# Patient Record
Sex: MALE | Race: WHITE | Employment: OTHER | URBAN - METROPOLITAN AREA
[De-identification: names, ages, dates, MRNs, and addresses within clinical notes are randomized per-mention and may not be internally consistent; named-entity substitution may affect disease eponyms.]

---

## 2021-06-11 PROCEDURE — 88112 CYTOPATH CELL ENHANCE TECH: CPT

## 2021-06-14 ENCOUNTER — HOSPITAL ENCOUNTER (OUTPATIENT)
Dept: LAB | Age: 76
Discharge: HOME | End: 2021-06-14

## 2021-09-13 ENCOUNTER — HOSPITAL ENCOUNTER (OUTPATIENT)
Dept: LAB | Age: 76
Discharge: HOME | End: 2021-09-13
Payer: MEDICARE

## 2021-09-13 DIAGNOSIS — C68.9 UROTHELIAL CANCER (HCC): ICD-10-CM

## 2021-09-13 LAB
APPEARANCE UR: CLEAR
BACTERIA URNS QL MICRO: 0 /HPF
BILIRUB UR QL: NEGATIVE
CASTS URNS QL MICRO: 0 /LPF
COLOR UR: YELLOW
CRYSTALS URNS QL MICRO: 0 /LPF
EPI CELLS #/AREA URNS HPF: 0 /HPF
GLUCOSE UR STRIP.AUTO-MCNC: NEGATIVE MG/DL
HGB UR QL STRIP: ABNORMAL
KETONES UR QL STRIP.AUTO: NEGATIVE MG/DL
LEUKOCYTE ESTERASE UR QL STRIP.AUTO: NEGATIVE
MUCOUS THREADS URNS QL MICRO: 0 /LPF
NITRITE UR QL STRIP.AUTO: NEGATIVE
PH UR STRIP: 6.5 [PH] (ref 5–9)
PROT UR STRIP-MCNC: NEGATIVE MG/DL
RBC #/AREA URNS HPF: NORMAL /HPF
SP GR UR REFRACTOMETRY: 1.02 (ref 1–1.02)
UROBILINOGEN UR QL STRIP.AUTO: 0.2 EU/DL (ref 0.2–1)
WBC URNS QL MICRO: 0 /HPF

## 2021-09-13 PROCEDURE — 88112 CYTOPATH CELL ENHANCE TECH: CPT

## 2021-09-13 PROCEDURE — 81003 URINALYSIS AUTO W/O SCOPE: CPT

## 2021-09-13 PROCEDURE — 81015 MICROSCOPIC EXAM OF URINE: CPT

## 2021-09-29 ENCOUNTER — HOSPITAL ENCOUNTER (OUTPATIENT)
Dept: CT IMAGING | Age: 76
Discharge: HOME | End: 2021-09-29
Attending: UROLOGY
Payer: MEDICARE

## 2021-09-29 DIAGNOSIS — Z85.46 HISTORY OF PROSTATE CANCER: ICD-10-CM

## 2021-09-29 LAB — CREAT BLD-MCNC: 0.73 MG/DL (ref 0.8–1.5)

## 2021-09-29 PROCEDURE — 82565 ASSAY OF CREATININE: CPT

## 2021-09-29 PROCEDURE — 74011000258 HC RX REV CODE- 258: Performed by: UROLOGY

## 2021-09-29 PROCEDURE — 74011000636 HC RX REV CODE- 636: Performed by: UROLOGY

## 2021-09-29 PROCEDURE — 74178 CT ABD&PLV WO CNTR FLWD CNTR: CPT

## 2021-09-29 RX ORDER — SODIUM CHLORIDE 0.9 % (FLUSH) 0.9 %
10 SYRINGE (ML) INJECTION
Status: COMPLETED | OUTPATIENT
Start: 2021-09-29 | End: 2021-09-29

## 2021-09-29 RX ADMIN — SODIUM CHLORIDE 100 ML: 900 INJECTION, SOLUTION INTRAVENOUS at 09:12

## 2021-09-29 RX ADMIN — Medication 10 ML: at 09:12

## 2021-09-29 RX ADMIN — IOPAMIDOL 100 ML: 755 INJECTION, SOLUTION INTRAVENOUS at 09:11

## 2021-12-13 ENCOUNTER — HOSPITAL ENCOUNTER (OUTPATIENT)
Dept: LAB | Age: 76
Discharge: HOME | End: 2021-12-13
Payer: MEDICARE

## 2021-12-13 DIAGNOSIS — C68.9 UROTHELIAL CANCER (HCC): ICD-10-CM

## 2021-12-13 LAB
APPEARANCE UR: CLEAR
BACTERIA URNS QL MICRO: 0 /HPF
BILIRUB UR QL: NEGATIVE
CASTS URNS QL MICRO: 0 /LPF
COLOR UR: YELLOW
CRYSTALS URNS QL MICRO: 0 /LPF
EPI CELLS #/AREA URNS HPF: 0 /HPF
GLUCOSE UR STRIP.AUTO-MCNC: NEGATIVE MG/DL
HGB UR QL STRIP: ABNORMAL
KETONES UR QL STRIP.AUTO: NEGATIVE MG/DL
LEUKOCYTE ESTERASE UR QL STRIP.AUTO: NEGATIVE
MUCOUS THREADS URNS QL MICRO: 0 /LPF
NITRITE UR QL STRIP.AUTO: NEGATIVE
PH UR STRIP: 6 [PH] (ref 5–9)
PROT UR STRIP-MCNC: NEGATIVE MG/DL
RBC #/AREA URNS HPF: NORMAL /HPF
SP GR UR REFRACTOMETRY: 1.02 (ref 1–1.02)
UROBILINOGEN UR QL STRIP.AUTO: 0.2 EU/DL (ref 0.2–1)
WBC URNS QL MICRO: NORMAL /HPF

## 2021-12-13 PROCEDURE — 81015 MICROSCOPIC EXAM OF URINE: CPT

## 2021-12-13 PROCEDURE — 88112 CYTOPATH CELL ENHANCE TECH: CPT

## 2021-12-13 PROCEDURE — 81003 URINALYSIS AUTO W/O SCOPE: CPT

## 2021-12-14 LAB
NON-GYNECOLOGIC CYTOLOGY REPRT: NORMAL
SPECIMEN SOURCE: NORMAL

## 2021-12-21 PROBLEM — C67.2 MALIGNANT NEOPLASM OF LATERAL WALL OF URINARY BLADDER (HCC): Status: ACTIVE | Noted: 2021-12-21

## 2021-12-27 ENCOUNTER — HOSPITAL ENCOUNTER (OUTPATIENT)
Dept: INFUSION THERAPY | Age: 76
Discharge: HOME | End: 2021-12-27
Payer: MEDICARE

## 2021-12-27 ENCOUNTER — HOSPITAL ENCOUNTER (OUTPATIENT)
Dept: LAB | Age: 76
Discharge: HOME | End: 2021-12-27

## 2021-12-27 VITALS
TEMPERATURE: 98.2 F | HEART RATE: 67 BPM | DIASTOLIC BLOOD PRESSURE: 100 MMHG | BODY MASS INDEX: 44.73 KG/M2 | SYSTOLIC BLOOD PRESSURE: 164 MMHG | OXYGEN SATURATION: 95 % | RESPIRATION RATE: 18 BRPM | WEIGHT: 315 LBS

## 2021-12-27 DIAGNOSIS — C67.2 MALIGNANT NEOPLASM OF LATERAL WALL OF URINARY BLADDER (HCC): Primary | ICD-10-CM

## 2021-12-27 LAB
APPEARANCE UR: CLEAR
BACTERIA URNS QL MICRO: 0 /HPF
BILIRUB UR QL: NEGATIVE
CASTS URNS QL MICRO: 0 /LPF
COLOR UR: YELLOW
CRYSTALS URNS QL MICRO: 0 /LPF
EPI CELLS #/AREA URNS HPF: ABNORMAL /HPF
GLUCOSE UR STRIP.AUTO-MCNC: NEGATIVE MG/DL
HGB UR QL STRIP: ABNORMAL
KETONES UR QL STRIP.AUTO: NEGATIVE MG/DL
LEUKOCYTE ESTERASE UR QL STRIP.AUTO: NEGATIVE
MUCOUS THREADS URNS QL MICRO: ABNORMAL /LPF
NITRITE UR QL STRIP.AUTO: NEGATIVE
PH UR STRIP: 5.5 [PH] (ref 5–9)
PROT UR STRIP-MCNC: NEGATIVE MG/DL
RBC #/AREA URNS HPF: ABNORMAL /HPF
SP GR UR REFRACTOMETRY: 1.02 (ref 1–1.02)
UROBILINOGEN UR QL STRIP.AUTO: 0.2 EU/DL (ref 0.2–1)
WBC URNS QL MICRO: ABNORMAL /HPF

## 2021-12-27 PROCEDURE — 74011000258 HC RX REV CODE- 258: Performed by: UROLOGY

## 2021-12-27 PROCEDURE — 74011000250 HC RX REV CODE- 250: Performed by: UROLOGY

## 2021-12-27 PROCEDURE — 81015 MICROSCOPIC EXAM OF URINE: CPT

## 2021-12-27 PROCEDURE — 51720 TREATMENT OF BLADDER LESION: CPT

## 2021-12-27 PROCEDURE — 74011250636 HC RX REV CODE- 250/636: Performed by: UROLOGY

## 2021-12-27 PROCEDURE — 81003 URINALYSIS AUTO W/O SCOPE: CPT

## 2021-12-27 RX ORDER — LIDOCAINE HYDROCHLORIDE 20 MG/ML
JELLY TOPICAL ONCE
Status: COMPLETED | OUTPATIENT
Start: 2021-12-27 | End: 2021-12-27

## 2021-12-27 RX ADMIN — LIDOCAINE HYDROCHLORIDE: 20 JELLY TOPICAL at 17:30

## 2021-12-27 RX ADMIN — SODIUM CHLORIDE 50 MG: 9 INJECTION, SOLUTION INTRAVENOUS at 17:45

## 2021-12-27 NOTE — PROGRESS NOTES
Arrived to the [facility] ambulatory. Atkins placed by Dallas Fuentes RN. UA and BCG Intravesical completed. Patient tolerated well. Any issues or concerns during appointment: no.  Patient aware of next infusion appointment on 1/3 at 1700. Patient instructed to call provider with temperature of 100.4 or greater or nausea/vomiting/ diarrhea or pain not controlled by medications  Discharged to home ambulatory.

## 2022-01-03 ENCOUNTER — HOSPITAL ENCOUNTER (OUTPATIENT)
Dept: LAB | Age: 77
Discharge: HOME | End: 2022-01-03
Payer: MEDICARE

## 2022-01-03 ENCOUNTER — HOSPITAL ENCOUNTER (OUTPATIENT)
Dept: INFUSION THERAPY | Age: 77
Discharge: HOME | End: 2022-01-03
Payer: MEDICARE

## 2022-01-03 VITALS
DIASTOLIC BLOOD PRESSURE: 99 MMHG | TEMPERATURE: 97.6 F | SYSTOLIC BLOOD PRESSURE: 133 MMHG | WEIGHT: 315 LBS | HEART RATE: 72 BPM | OXYGEN SATURATION: 94 % | RESPIRATION RATE: 18 BRPM | BODY MASS INDEX: 44.21 KG/M2

## 2022-01-03 DIAGNOSIS — C67.2 MALIGNANT NEOPLASM OF LATERAL WALL OF URINARY BLADDER (HCC): Primary | ICD-10-CM

## 2022-01-03 DIAGNOSIS — C67.2 MALIGNANT NEOPLASM OF LATERAL WALL OF URINARY BLADDER (HCC): ICD-10-CM

## 2022-01-03 LAB
APPEARANCE UR: CLEAR
BACTERIA URNS QL MICRO: NORMAL /HPF
BILIRUB UR QL: NEGATIVE
CASTS URNS QL MICRO: 0 /LPF
COLOR UR: YELLOW
CRYSTALS URNS QL MICRO: 0 /LPF
EPI CELLS #/AREA URNS HPF: NORMAL /HPF
GLUCOSE UR STRIP.AUTO-MCNC: NEGATIVE MG/DL
HGB UR QL STRIP: ABNORMAL
KETONES UR QL STRIP.AUTO: NEGATIVE MG/DL
LEUKOCYTE ESTERASE UR QL STRIP.AUTO: NEGATIVE
MUCOUS THREADS URNS QL MICRO: NORMAL /LPF
NITRITE UR QL STRIP.AUTO: NEGATIVE
PH UR STRIP: 6 [PH] (ref 5–9)
PROT UR STRIP-MCNC: NEGATIVE MG/DL
RBC #/AREA URNS HPF: NORMAL /HPF
SP GR UR REFRACTOMETRY: >=1.03 (ref 1–1.02)
UROBILINOGEN UR QL STRIP.AUTO: 0.2 EU/DL (ref 0.2–1)
WBC URNS QL MICRO: NORMAL /HPF

## 2022-01-03 PROCEDURE — 51720 TREATMENT OF BLADDER LESION: CPT

## 2022-01-03 PROCEDURE — 74011000258 HC RX REV CODE- 258: Performed by: UROLOGY

## 2022-01-03 PROCEDURE — 81015 MICROSCOPIC EXAM OF URINE: CPT

## 2022-01-03 PROCEDURE — 74011250636 HC RX REV CODE- 250/636: Performed by: UROLOGY

## 2022-01-03 PROCEDURE — 74011000250 HC RX REV CODE- 250: Performed by: UROLOGY

## 2022-01-03 PROCEDURE — 81003 URINALYSIS AUTO W/O SCOPE: CPT

## 2022-01-03 RX ORDER — LIDOCAINE HYDROCHLORIDE 20 MG/ML
JELLY TOPICAL ONCE
Status: COMPLETED | OUTPATIENT
Start: 2022-01-03 | End: 2022-01-03

## 2022-01-03 RX ADMIN — LIDOCAINE HYDROCHLORIDE: 20 JELLY TOPICAL at 17:21

## 2022-01-03 RX ADMIN — SODIUM CHLORIDE 50 MG: 900 INJECTION, SOLUTION INTRAVENOUS at 17:40

## 2022-01-03 NOTE — PROGRESS NOTES
Reviewed Intravesicular instillation of BCG to bladder with patient. Good understanding of procedure voiced.

## 2022-01-03 NOTE — ADDENDUM NOTE
Encounter addended by: Cynthia Castañeda, [facility] [facility] on: 1/3/2022 5:29 PM   Actions taken: i-Vent created or edited

## 2022-01-03 NOTE — PROGRESS NOTES
Arrived to dept for Intravesicular live BCG treatment. #16 Zelaya catheter placed via sterile technique per Kassidy Galvan RN with yellow urine obtained. Lidocaine gel used as per order prior to zelaya placement. PPE used as per policy and Intravesicular BCG instilled without difficulty. Patient tolerated without bladder cramping. Patient aware to hold urine for 2 hours, empty bladder, use 2 cups of bleach after each voiding to toilet and allow to set in toilet 15-20 minutes and then flush. Verbalizes understanding. Patient to notify MD for hematuria, fever 100.4 or greater,any uncontrolled n/v, or diarrhea. Verbalizes understanding. Patient discharged via ambulation accompanied by wife. Next appointment is 01/10/2022 at 1700 with Infusion.

## 2022-01-10 ENCOUNTER — HOSPITAL ENCOUNTER (OUTPATIENT)
Dept: INFUSION THERAPY | Age: 77
Discharge: HOME | End: 2022-01-10
Payer: MEDICARE

## 2022-01-10 ENCOUNTER — HOSPITAL ENCOUNTER (OUTPATIENT)
Dept: LAB | Age: 77
Discharge: HOME | End: 2022-01-10

## 2022-01-10 VITALS
HEART RATE: 65 BPM | OXYGEN SATURATION: 96 % | DIASTOLIC BLOOD PRESSURE: 87 MMHG | TEMPERATURE: 98 F | BODY MASS INDEX: 44.08 KG/M2 | WEIGHT: 315 LBS | RESPIRATION RATE: 18 BRPM | SYSTOLIC BLOOD PRESSURE: 140 MMHG

## 2022-01-10 DIAGNOSIS — C67.2 MALIGNANT NEOPLASM OF LATERAL WALL OF URINARY BLADDER (HCC): Primary | ICD-10-CM

## 2022-01-10 LAB
APPEARANCE UR: CLEAR
BACTERIA URNS QL MICRO: NORMAL /HPF
BILIRUB UR QL: NEGATIVE
CASTS URNS QL MICRO: 0 /LPF
COLOR UR: YELLOW
CRYSTALS URNS QL MICRO: 0 /LPF
EPI CELLS #/AREA URNS HPF: NORMAL /HPF
GLUCOSE UR STRIP.AUTO-MCNC: NEGATIVE MG/DL
HGB UR QL STRIP: ABNORMAL
KETONES UR QL STRIP.AUTO: NEGATIVE MG/DL
LEUKOCYTE ESTERASE UR QL STRIP.AUTO: NEGATIVE
MUCOUS THREADS URNS QL MICRO: 0 /LPF
NITRITE UR QL STRIP.AUTO: NEGATIVE
PH UR STRIP: 6 [PH] (ref 5–9)
PROT UR STRIP-MCNC: NEGATIVE MG/DL
RBC #/AREA URNS HPF: NORMAL /HPF
SP GR UR REFRACTOMETRY: >=1.03 (ref 1–1.02)
UROBILINOGEN UR QL STRIP.AUTO: 0.2 EU/DL (ref 0.2–1)
WBC URNS QL MICRO: NORMAL /HPF

## 2022-01-10 PROCEDURE — 51720 TREATMENT OF BLADDER LESION: CPT

## 2022-01-10 PROCEDURE — 81015 MICROSCOPIC EXAM OF URINE: CPT

## 2022-01-10 PROCEDURE — 74011000250 HC RX REV CODE- 250: Performed by: UROLOGY

## 2022-01-10 PROCEDURE — 81003 URINALYSIS AUTO W/O SCOPE: CPT

## 2022-01-10 PROCEDURE — 74011000258 HC RX REV CODE- 258: Performed by: UROLOGY

## 2022-01-10 PROCEDURE — 74011250636 HC RX REV CODE- 250/636: Performed by: UROLOGY

## 2022-01-10 RX ORDER — LIDOCAINE HYDROCHLORIDE 20 MG/ML
JELLY TOPICAL ONCE
Status: COMPLETED | OUTPATIENT
Start: 2022-01-10 | End: 2022-01-10

## 2022-01-10 RX ADMIN — LIDOCAINE HYDROCHLORIDE: 20 JELLY TOPICAL at 17:15

## 2022-01-10 RX ADMIN — SODIUM CHLORIDE 50 MG: 9 INJECTION, SOLUTION INTRAVENOUS at 17:50

## 2022-01-10 NOTE — ADDENDUM NOTE
Encounter addended by: Joselin Winn, [address] [facility] on: 1/10/2022 5:10 PM   Actions taken: iLaureen created or edited

## 2022-01-10 NOTE — ADDENDUM NOTE
Encounter addended by: Nenita Penny on: 1/10/2022 5:27 PM   Actions taken: i-Sarahi created or edited

## 2022-01-10 NOTE — PROGRESS NOTES
Arrived to the [facility]. #16 Atkins inserted by Fabiola Canales RN with premed of Lidociane gel. PPE worn per policy. BCG instillation completed. Patient tolerated well. Pt educated to hold urine for 2 hours, empty bladder, and then use 2 cups of bleach after voiding in toilet while waiting 15 to 20 min to flush. Any issues or concerns during appointment: verbalized discontent with wait time on lab result for UA and preparation time with the pharmacy. Emotional support given. Patient aware of next infusion appointment on (none. .has completed 3 out of 3 treatments). Patient aware of next lab and [facility] office visit on 4/1/22 at 21 [phone]. Patient instructed to call provider with hematuria, temperature of 100.4 or greater or nausea/vomiting/ diarrhea or pain not controlled by medications  Discharged amb.

## 2022-03-20 PROBLEM — C67.2 MALIGNANT NEOPLASM OF LATERAL WALL OF URINARY BLADDER (HCC): Status: ACTIVE | Noted: 2021-12-21

## 2022-04-01 ENCOUNTER — HOSPITAL ENCOUNTER (OUTPATIENT)
Dept: LAB | Age: 77
Discharge: HOME | End: 2022-04-01

## 2022-08-04 NOTE — PROGRESS NOTES
FOLLOW UP CYSTOSCOPY PROCEDURE CLINIC NOTE      INTERVAL HISTORY: Patient is here today for routine surveillance cystoscopy. 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 He is status post an induction course of BCG and completed his third maintenance course on 1/10/2022. He has had a history of abnormal urinary cytologies that might be related to his prior radiation treatment. He has no complaints today he denies any hematuria. He has been feeling well. From previous note(s):  He has no complaints today. He tolerated his third maintenance course of BCG well. He completed it on 1/10/2022. He currently does not have any worsening lower urinary tract symptoms. He denies any gross hematuria. From prior note:     He is a prior patient of Dr. Lexi Jesus in [place]. 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 He also has a distant history of prostate cancer which was treated back in 2004 with external beam radiation. He underwent an induction course of BCG which he completed on 8/13/2020. He then had his first 3-week maintenance course finishing in 12/1/20. He underwent a bladder biopsy on 1/27/2021. It was a right lateral wall biopsy that showed chronic inflammation and benign urothelium. He had a suspicious urine cytology at last several appts . He finished his second BCG maintenance (3 week course) on 7/14/21. His third maintenance course of BCG was completed on 11/10/2022. Suspicious urine cytologies may be from prior radiation. CT IVP 9/29/21 unremarkable. HISTORY OF PRESENT ILLNESS: Mr. Serena Vivas is a 78 y.o. male with a diagnosis of UC with above history; patient is here today for follow-up surveillance cystoscopy      Past medical history, surgical history, medications, allergies, family history and social history were reviewed and are unchanged other than what is noted above.  Patient denies any hematuria or new lower urinary tract symptoms. REVIEW OF SYSTEMS: As above. All other systems negative. ALLERGIES:  Not on File    PHYSICAL EXAMINATION:  There were no vitals taken for this visit. GENERAL: The patient is in no acute distress. CV:  Reg. Normal perfusion  PULMONARY: normal respiratory effort, no JVD, no audible wheezing. ABDOMEN: Soft, nontender. GENITOURINARY EXAM: Unremarkable. EXTREMITIES: Warm and well perfused. LABORATORY RESULTS:  No results found for this visit on 08/05/22. PROCEDURE NOTE: After informed consent was obtained, the patient was prepped and draped in the usual sterile fashion. A 2% lidocaine jelly was instilled in the patient's urethra. We performed a pre-procedure time out. I then inserted a flexible cystoscope and under direct vision carried it into the patient's bladder. The urethra appeared normal.  The right and left ureteral orifices appeared normal.  The entire bladder was surveyed and showed normal-appearing bladder. He has a well-healed TUR scar on the right sidewall. There is no evidence of tumor stones or any mucosal abnormalities. The scope was removed and the patient tolerated the procedure well. There were no complications. IMPRESSION AND PLAN: Mr. Todd Hernandez is a 78 y.o. male with a diagnosis of HG Ta UC, s/p BCG. I see no evidence of disease recurrence today. I would like to check a urine cytology. We discussed moving forward with his fourth maintenance BCG treatment in the next several weeks. Again we discussed the risk and benefits of BCG. He would like to move forward with that treatment. I like to see him back in approximately 3 months for his next surveillance cystoscopy.

## 2022-08-05 ENCOUNTER — PROCEDURE VISIT (OUTPATIENT)
Dept: ONCOLOGY | Age: 77
End: 2022-08-05
Payer: MEDICARE

## 2022-08-05 ENCOUNTER — HOSPITAL ENCOUNTER (OUTPATIENT)
Dept: LAB | Age: 77
Discharge: HOME | End: 2022-08-08
Payer: MEDICARE

## 2022-08-05 VITALS
HEIGHT: 72 IN | TEMPERATURE: 97.7 F | DIASTOLIC BLOOD PRESSURE: 69 MMHG | HEART RATE: 61 BPM | OXYGEN SATURATION: 96 % | SYSTOLIC BLOOD PRESSURE: 105 MMHG | RESPIRATION RATE: 16 BRPM | BODY MASS INDEX: 42.66 KG/M2 | WEIGHT: 315 LBS

## 2022-08-05 DIAGNOSIS — C67.2 MALIGNANT NEOPLASM OF LATERAL WALL OF URINARY BLADDER (HCC): ICD-10-CM

## 2022-08-05 DIAGNOSIS — C67.2 MALIGNANT NEOPLASM OF LATERAL WALL OF URINARY BLADDER (HCC): Primary | ICD-10-CM

## 2022-08-05 LAB
BILIRUBIN, URINE, POC: NEGATIVE
BLOOD URINE, POC: NEGATIVE
GLUCOSE URINE, POC: NEGATIVE
KETONES, URINE, POC: NEGATIVE
LEUKOCYTE ESTERASE, URINE, POC: NEGATIVE
NITRITE, URINE, POC: NEGATIVE
PH, URINE, POC: 5.5 (ref 4.6–8)
PROTEIN,URINE, POC: NEGATIVE
SPECIFIC GRAVITY, URINE, POC: 1.02 (ref 1–1.03)
URINALYSIS CLARITY, POC: CLEAR
URINALYSIS COLOR, POC: YELLOW
UROBILINOGEN, POC: 0.2

## 2022-08-05 PROCEDURE — 81003 URINALYSIS AUTO W/O SCOPE: CPT | Performed by: UROLOGY

## 2022-08-05 PROCEDURE — 88112 CYTOPATH CELL ENHANCE TECH: CPT

## 2022-08-05 PROCEDURE — 52000 CYSTOURETHROSCOPY: CPT | Performed by: UROLOGY

## 2022-08-05 RX ORDER — GABAPENTIN 300 MG/1
CAPSULE ORAL
COMMUNITY
Start: 2022-08-01

## 2022-08-05 ASSESSMENT — PATIENT HEALTH QUESTIONNAIRE - PHQ9
SUM OF ALL RESPONSES TO PHQ QUESTIONS 1-9: 0
2. FEELING DOWN, DEPRESSED OR HOPELESS: 0
SUM OF ALL RESPONSES TO PHQ QUESTIONS 1-9: 0

## 2022-08-05 NOTE — PATIENT INSTRUCTIONS
Patient Instructions from Today's Visit    Reason for Visit:  Cystoscopy     Diagnosis Information:  [email or web address]    Plan:  Cystoscopy today     Follow Up: We will continue with BCG treatments. We will do another cystoscopy in 3 months. Recent Lab Results:  N/a    Treatment Summary has been discussed and given to patient: n/a        -------------------------------------------------------------------------------------------------------------------  Please call our office at [phone] if you have any  of the following symptoms:   Fever of 100.5 or greater  Chills  Shortness of breath  Swelling or pain in one leg    After office hours an answering service is available and will contact a provider for emergencies or if you are experiencing any of the above symptoms. Patient does express an interest in My Chart. My Chart log in information explained on the after visit summary printout at the [facility] desk.     Lu Parham MA

## 2022-08-08 ENCOUNTER — TELEPHONE (OUTPATIENT)
Dept: ONCOLOGY | Age: 77
End: 2022-08-08

## 2022-08-08 LAB
CYTOLOGY-NON GYN: NORMAL
SPECIMEN SOURCE: NORMAL

## 2022-08-09 ENCOUNTER — TELEPHONE (OUTPATIENT)
Dept: ONCOLOGY | Age: 77
End: 2022-08-09

## 2022-08-09 NOTE — TELEPHONE ENCOUNTER
Spoke with pt and reviewed recent urine cytology per . pt informed me he already read the results on Drawn to Scale.  Informed pt to call with any future questions or concerns. //KG

## 2022-08-12 RX ORDER — DIPHENHYDRAMINE HYDROCHLORIDE 50 MG/ML
50 INJECTION INTRAMUSCULAR; INTRAVENOUS
Status: CANCELLED | OUTPATIENT
Start: 2022-08-17

## 2022-08-12 RX ORDER — SODIUM CHLORIDE 9 MG/ML
INJECTION, SOLUTION INTRAVENOUS CONTINUOUS
Status: CANCELLED | OUTPATIENT
Start: 2022-08-17

## 2022-08-12 RX ORDER — ALBUTEROL SULFATE 90 UG/1
4 AEROSOL, METERED RESPIRATORY (INHALATION) PRN
Status: CANCELLED | OUTPATIENT
Start: 2022-08-17

## 2022-08-12 RX ORDER — ONDANSETRON 2 MG/ML
8 INJECTION INTRAMUSCULAR; INTRAVENOUS
Status: CANCELLED | OUTPATIENT
Start: 2022-08-17

## 2022-08-12 RX ORDER — ONDANSETRON 2 MG/ML
8 INJECTION INTRAMUSCULAR; INTRAVENOUS
Status: CANCELLED | OUTPATIENT
Start: 2022-08-24

## 2022-08-12 RX ORDER — SODIUM CHLORIDE 9 MG/ML
INJECTION, SOLUTION INTRAVENOUS CONTINUOUS
Status: CANCELLED | OUTPATIENT
Start: 2022-08-24

## 2022-08-12 RX ORDER — DIPHENHYDRAMINE HYDROCHLORIDE 50 MG/ML
50 INJECTION INTRAMUSCULAR; INTRAVENOUS
Status: CANCELLED | OUTPATIENT
Start: 2022-08-24

## 2022-08-12 RX ORDER — LIDOCAINE HYDROCHLORIDE 20 MG/ML
JELLY TOPICAL ONCE
Status: CANCELLED | OUTPATIENT
Start: 2022-08-24 | End: 2022-08-24

## 2022-08-12 RX ORDER — MEPERIDINE HYDROCHLORIDE 50 MG/ML
12.5 INJECTION INTRAMUSCULAR; INTRAVENOUS; SUBCUTANEOUS PRN
Status: CANCELLED | OUTPATIENT
Start: 2022-08-24

## 2022-08-12 RX ORDER — MEPERIDINE HYDROCHLORIDE 50 MG/ML
12.5 INJECTION INTRAMUSCULAR; INTRAVENOUS; SUBCUTANEOUS PRN
Status: CANCELLED | OUTPATIENT
Start: 2022-08-17

## 2022-08-12 RX ORDER — ALBUTEROL SULFATE 90 UG/1
4 AEROSOL, METERED RESPIRATORY (INHALATION) PRN
Status: CANCELLED | OUTPATIENT
Start: 2022-08-24

## 2022-08-12 RX ORDER — ONDANSETRON 2 MG/ML
8 INJECTION INTRAMUSCULAR; INTRAVENOUS
Status: CANCELLED | OUTPATIENT
Start: 2022-08-31

## 2022-08-12 RX ORDER — FAMOTIDINE 10 MG/ML
20 INJECTION, SOLUTION INTRAVENOUS
Status: CANCELLED | OUTPATIENT
Start: 2022-08-24

## 2022-08-12 RX ORDER — ACETAMINOPHEN 325 MG/1
650 TABLET ORAL
Status: CANCELLED | OUTPATIENT
Start: 2022-08-31

## 2022-08-12 RX ORDER — FAMOTIDINE 10 MG/ML
20 INJECTION, SOLUTION INTRAVENOUS
Status: CANCELLED | OUTPATIENT
Start: 2022-08-31

## 2022-08-12 RX ORDER — LIDOCAINE HYDROCHLORIDE 20 MG/ML
JELLY TOPICAL ONCE
Status: CANCELLED | OUTPATIENT
Start: 2022-08-31 | End: 2022-08-31

## 2022-08-12 RX ORDER — FAMOTIDINE 10 MG/ML
20 INJECTION, SOLUTION INTRAVENOUS
Status: CANCELLED | OUTPATIENT
Start: 2022-08-17

## 2022-08-12 RX ORDER — EPINEPHRINE 1 MG/ML
0.3 INJECTION, SOLUTION, CONCENTRATE INTRAVENOUS PRN
Status: CANCELLED | OUTPATIENT
Start: 2022-08-31

## 2022-08-12 RX ORDER — MEPERIDINE HYDROCHLORIDE 50 MG/ML
12.5 INJECTION INTRAMUSCULAR; INTRAVENOUS; SUBCUTANEOUS PRN
Status: CANCELLED | OUTPATIENT
Start: 2022-08-31

## 2022-08-12 RX ORDER — ACETAMINOPHEN 325 MG/1
650 TABLET ORAL
Status: CANCELLED | OUTPATIENT
Start: 2022-08-17

## 2022-08-12 RX ORDER — SODIUM CHLORIDE 9 MG/ML
INJECTION, SOLUTION INTRAVENOUS CONTINUOUS
Status: CANCELLED | OUTPATIENT
Start: 2022-08-31

## 2022-08-12 RX ORDER — EPINEPHRINE 1 MG/ML
0.3 INJECTION, SOLUTION, CONCENTRATE INTRAVENOUS PRN
Status: CANCELLED | OUTPATIENT
Start: 2022-08-24

## 2022-08-12 RX ORDER — ACETAMINOPHEN 325 MG/1
650 TABLET ORAL
Status: CANCELLED | OUTPATIENT
Start: 2022-08-24

## 2022-08-12 RX ORDER — DIPHENHYDRAMINE HYDROCHLORIDE 50 MG/ML
50 INJECTION INTRAMUSCULAR; INTRAVENOUS
Status: CANCELLED | OUTPATIENT
Start: 2022-08-31

## 2022-08-12 RX ORDER — EPINEPHRINE 1 MG/ML
0.3 INJECTION, SOLUTION, CONCENTRATE INTRAVENOUS PRN
Status: CANCELLED | OUTPATIENT
Start: 2022-08-17

## 2022-08-12 RX ORDER — LIDOCAINE HYDROCHLORIDE 20 MG/ML
JELLY TOPICAL ONCE
Status: CANCELLED | OUTPATIENT
Start: 2022-08-17 | End: 2022-08-17

## 2022-08-12 RX ORDER — ALBUTEROL SULFATE 90 UG/1
4 AEROSOL, METERED RESPIRATORY (INHALATION) PRN
Status: CANCELLED | OUTPATIENT
Start: 2022-08-31

## 2022-08-17 ENCOUNTER — HOSPITAL ENCOUNTER (OUTPATIENT)
Dept: INFUSION THERAPY | Age: 77
Discharge: HOME | End: 2022-08-17
Payer: MEDICARE

## 2022-08-17 ENCOUNTER — HOSPITAL ENCOUNTER (OUTPATIENT)
Dept: LAB | Age: 77
Discharge: HOME | End: 2022-08-20

## 2022-08-17 VITALS
DIASTOLIC BLOOD PRESSURE: 72 MMHG | WEIGHT: 315 LBS | RESPIRATION RATE: 16 BRPM | BODY MASS INDEX: 44.05 KG/M2 | HEART RATE: 57 BPM | SYSTOLIC BLOOD PRESSURE: 113 MMHG | OXYGEN SATURATION: 97 % | TEMPERATURE: 97.9 F

## 2022-08-17 DIAGNOSIS — C67.2 MALIGNANT NEOPLASM OF LATERAL WALL OF URINARY BLADDER (HCC): Primary | ICD-10-CM

## 2022-08-17 LAB
APPEARANCE UR: CLEAR
BILIRUB UR QL: NEGATIVE
COLOR UR: YELLOW
GLUCOSE UR STRIP.AUTO-MCNC: NEGATIVE MG/DL
HGB UR QL STRIP: NEGATIVE
KETONES UR QL STRIP.AUTO: NEGATIVE MG/DL
LEUKOCYTE ESTERASE UR QL STRIP.AUTO: NEGATIVE
NITRITE UR QL STRIP.AUTO: NEGATIVE
PH UR STRIP: 6 [PH] (ref 5–9)
PROT UR STRIP-MCNC: NEGATIVE MG/DL
SP GR UR REFRACTOMETRY: 1.02 (ref 1–1.02)
UROBILINOGEN UR QL STRIP.AUTO: 0.2 EU/DL (ref 0.2–1)

## 2022-08-17 PROCEDURE — 51720 TREATMENT OF BLADDER LESION: CPT

## 2022-08-17 PROCEDURE — 6360000002 HC RX W HCPCS: Performed by: UROLOGY

## 2022-08-17 PROCEDURE — 81003 URINALYSIS AUTO W/O SCOPE: CPT

## 2022-08-17 PROCEDURE — 6370000000 HC RX 637 (ALT 250 FOR IP): Performed by: UROLOGY

## 2022-08-17 PROCEDURE — 2580000003 HC RX 258: Performed by: UROLOGY

## 2022-08-17 RX ORDER — LIDOCAINE HYDROCHLORIDE 20 MG/ML
JELLY TOPICAL ONCE
Status: COMPLETED | OUTPATIENT
Start: 2022-08-17 | End: 2022-08-17

## 2022-08-17 RX ADMIN — LIDOCAINE HYDROCHLORIDE: 20 JELLY TOPICAL at 10:10

## 2022-08-17 RX ADMIN — SODIUM CHLORIDE 50 MG: 900 INJECTION, SOLUTION INTRAVENOUS at 10:28

## 2022-08-17 NOTE — PROGRESS NOTES
Arrived to the [facility]. Consent signed and BCG bladder instillation completed. Patient tolerated well and no problems occurred during the procedure. Any issues or concerns during appointment: No.  Discharged home in stable condition.

## 2022-08-24 ENCOUNTER — HOSPITAL ENCOUNTER (OUTPATIENT)
Dept: INFUSION THERAPY | Age: 77
Discharge: HOME | End: 2022-08-24
Payer: MEDICARE

## 2022-08-24 ENCOUNTER — HOSPITAL ENCOUNTER (OUTPATIENT)
Dept: LAB | Age: 77
Discharge: HOME | End: 2022-08-27

## 2022-08-24 VITALS
BODY MASS INDEX: 43.94 KG/M2 | WEIGHT: 315 LBS | DIASTOLIC BLOOD PRESSURE: 76 MMHG | OXYGEN SATURATION: 97 % | HEART RATE: 67 BPM | SYSTOLIC BLOOD PRESSURE: 143 MMHG | TEMPERATURE: 98 F | RESPIRATION RATE: 16 BRPM

## 2022-08-24 DIAGNOSIS — C67.2 MALIGNANT NEOPLASM OF LATERAL WALL OF URINARY BLADDER (HCC): Primary | ICD-10-CM

## 2022-08-24 PROCEDURE — 6370000000 HC RX 637 (ALT 250 FOR IP): Performed by: UROLOGY

## 2022-08-24 PROCEDURE — 2580000003 HC RX 258: Performed by: UROLOGY

## 2022-08-24 PROCEDURE — 6360000002 HC RX W HCPCS: Performed by: UROLOGY

## 2022-08-24 PROCEDURE — 51720 TREATMENT OF BLADDER LESION: CPT

## 2022-08-24 PROCEDURE — 81003 URINALYSIS AUTO W/O SCOPE: CPT

## 2022-08-24 RX ORDER — LIDOCAINE HYDROCHLORIDE 20 MG/ML
JELLY TOPICAL ONCE
Status: COMPLETED | OUTPATIENT
Start: 2022-08-24 | End: 2022-08-24

## 2022-08-24 RX ADMIN — SODIUM CHLORIDE 50 MG: 9 INJECTION, SOLUTION INTRAVENOUS at 10:40

## 2022-08-24 RX ADMIN — LIDOCAINE HYDROCHLORIDE: 20 JELLY TOPICAL at 10:06

## 2022-08-24 NOTE — PROGRESS NOTES
Patient arrived to the infusion center for Intravesical BCG. Mojica Catheter placed using sterile technique and lidocaine jelly. BCG instilled into bladder and mojica removed. Patient tolerated procedure well. Patient given the following instructions: The medication should be retained for 2 hours for best results   You should attempt to rotate back to front and side to side for best results   Sit down on the toilet to urinate to avoid splashing and fully empty your bladder    After urinating, pour two (2) cups of household bleach onto the toilet    Let the medication and bleach mixture sit in the toilet for 15-20 minutes before flushing    Repeat the above process each time you urinate for six (6) hours after each treatment day    Wash your hands and genital areas thoroughly after you urinate   Drink plenty of fluids after your instillation to flush your bladder   You may experience some burning with your first void after your treatment. If this occurs, you need to increase your fluid intake   If you experience continued pain or burning on urination or you experience and of the following symptom you should contact your doctor     Urgency   Frequency of urination   Bright red blood or blood clots in the urine   Joint pain   Coughing   Skin rash    Fever   Chills   Malaise (feeling of general discomfort)   Increased fatigue   Flu-like symptoms     Patient discharged home to self care .   Next appointment is 8/31/22 at 10am

## 2022-08-31 ENCOUNTER — HOSPITAL ENCOUNTER (OUTPATIENT)
Dept: INFUSION THERAPY | Age: 77
Discharge: HOME | End: 2022-08-31
Payer: MEDICARE

## 2022-08-31 ENCOUNTER — HOSPITAL ENCOUNTER (OUTPATIENT)
Dept: LAB | Age: 77
Discharge: HOME | End: 2022-09-03

## 2022-08-31 VITALS
TEMPERATURE: 97.5 F | BODY MASS INDEX: 44.05 KG/M2 | WEIGHT: 315 LBS | OXYGEN SATURATION: 95 % | RESPIRATION RATE: 16 BRPM | DIASTOLIC BLOOD PRESSURE: 70 MMHG | SYSTOLIC BLOOD PRESSURE: 135 MMHG | HEART RATE: 61 BPM

## 2022-08-31 DIAGNOSIS — C67.2 MALIGNANT NEOPLASM OF LATERAL WALL OF URINARY BLADDER (HCC): Primary | ICD-10-CM

## 2022-08-31 LAB
APPEARANCE UR: CLEAR
BILIRUB UR QL: NEGATIVE
COLOR UR: YELLOW
GLUCOSE UR STRIP.AUTO-MCNC: NEGATIVE MG/DL
HGB UR QL STRIP: NEGATIVE
KETONES UR QL STRIP.AUTO: NEGATIVE MG/DL
LEUKOCYTE ESTERASE UR QL STRIP.AUTO: NEGATIVE
NITRITE UR QL STRIP.AUTO: NEGATIVE
PH UR STRIP: 5.5 [PH] (ref 5–9)
PROT UR STRIP-MCNC: NEGATIVE MG/DL
SP GR UR REFRACTOMETRY: >=1.03 (ref 1–1.02)
UROBILINOGEN UR QL STRIP.AUTO: 0.2 EU/DL (ref 0.2–1)

## 2022-08-31 PROCEDURE — 6360000002 HC RX W HCPCS: Performed by: UROLOGY

## 2022-08-31 PROCEDURE — 81003 URINALYSIS AUTO W/O SCOPE: CPT

## 2022-08-31 PROCEDURE — 2580000003 HC RX 258: Performed by: UROLOGY

## 2022-08-31 PROCEDURE — 51720 TREATMENT OF BLADDER LESION: CPT

## 2022-08-31 PROCEDURE — 6370000000 HC RX 637 (ALT 250 FOR IP): Performed by: UROLOGY

## 2022-08-31 RX ORDER — LIDOCAINE HYDROCHLORIDE 20 MG/ML
JELLY TOPICAL ONCE
Status: COMPLETED | OUTPATIENT
Start: 2022-08-31 | End: 2022-08-31

## 2022-08-31 RX ADMIN — LIDOCAINE HYDROCHLORIDE: 20 JELLY TOPICAL at 10:19

## 2022-08-31 RX ADMIN — SODIUM CHLORIDE 50 MG: 9 INJECTION, SOLUTION INTRAVENOUS at 10:58

## 2022-08-31 NOTE — PROGRESS NOTES
[facility] Labs and BCG instillation completed. Patient tolerated without problems. Any issues or concerns during appointment: no  Patient aware to follow up with MD as scheduled  Patient instructed to call provider with temperature of 100.4 or greater or nausea/vomiting/ diarrhea or pain not controlled by medications  Discharged ambulatory.

## 2022-11-23 NOTE — PROGRESS NOTES
FOLLOW UP CYSTOSCOPY PROCEDURE CLINIC NOTE    Patient is here today for follow-up. 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 He is status post an induction course of BCG and now for courses of maintenance BCG. He finished his fourth maintenance course on 8/31/2022. His last cytology from 8/5/2022 was suspicious. He has no complaints today. He denies any hematuria or dysuria. He did experience some worsening urgency during his latest BCG treatments, but that has since resolved. INTERVAL HISTORY:     From previous note(s):  Patient is here today for routine surveillance cystoscopy. 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 He is status post an induction course of BCG and completed his third maintenance course on 1/10/2022. He has had a history of abnormal urinary cytologies that might be related to his prior radiation treatment. He has no complaints today he denies any hematuria. He has been feeling well. From previous note(s):  He has no complaints today. He tolerated his third maintenance course of BCG well. He completed it on 1/10/2022. He currently does not have any worsening lower urinary tract symptoms. He denies any gross hematuria. From prior note:     He is a prior patient of Dr. Laurel Mensah in [place]. 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 He also has a distant history of prostate cancer which was treated back in 2004 with external beam radiation. He underwent an induction course of BCG which he completed on 8/13/2020. He then had his first 3-week maintenance course finishing in 12/1/20. He underwent a bladder biopsy on 1/27/2021. It was a right lateral wall biopsy that showed chronic inflammation and benign urothelium. He had a suspicious urine cytology at last several appts . He finished his second BCG maintenance (3 week course) on 7/14/21.    His third maintenance course of BCG was completed on 11/10/2022. Suspicious urine cytologies may be from prior radiation. CT IVP 9/29/21 unremarkable. HISTORY OF PRESENT ILLNESS: Mr. Genoveva Bates is a 78 y.o. male with a diagnosis of UC with above history; patient is here today for follow-up surveillance cystoscopy. Past medical history, surgical history, medications, allergies, family history and social history were reviewed and are unchanged other than what is noted above. Patient denies any hematuria or new lower urinary tract symptoms. REVIEW OF SYSTEMS: As above. All other systems negative. ALLERGIES:  No Known Allergies    PHYSICAL EXAMINATION:  There were no vitals taken for this visit. GENERAL: The patient is in no acute distress. CV:  Reg. Normal perfusion  PULMONARY: normal respiratory effort, no JVD, no audible wheezing. ABDOMEN: Soft, nontender. GENITOURINARY EXAM: Unremarkable. EXTREMITIES: Warm and well perfused. LABORATORY RESULTS:  No results found for this visit on 11/28/22. PROCEDURE NOTE: After informed consent was obtained, the patient was prepped and draped in the usual sterile fashion. A 2% lidocaine jelly was instilled in the patient's urethra. We performed a pre-procedure time out. I then inserted a flexible cystoscope and under direct vision carried it into the patient's bladder. The urethra appeared normal.  The right and left ureteral orifices appeared normal.  The entire bladder was surveyed and showed a well-healed TUR scar on right sidewall. There were no mucosal lesions, tumors or worrisome findings. The scope was removed and the patient tolerated the procedure well. There were no complications. IMPRESSION AND PLAN: Mr. Genoveva Bates is a 78 y.o. 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 I see no evidence of disease recurrence today. I am concerned about his previous suspicious cytology.   We will get a repeat urine cytology today. Of note his last CT scan was in September 2021. I plan to repeat that (CT abdomen pelvis with and without contrast and delayed images) when he returns in 3 months for cystoscopy. If his cytology today is suspicious, then we will send a FISH and go ahead and repeat his imaging sooner.     -RTC in 3 months for cysto and with CT IVP

## 2022-11-28 ENCOUNTER — PROCEDURE VISIT (OUTPATIENT)
Dept: ONCOLOGY | Age: 77
End: 2022-11-28
Payer: MEDICARE

## 2022-11-28 ENCOUNTER — HOSPITAL ENCOUNTER (OUTPATIENT)
Dept: LAB | Age: 77
Discharge: HOME | End: 2022-12-01
Payer: MEDICARE

## 2022-11-28 VITALS
TEMPERATURE: 97.5 F | HEART RATE: 63 BPM | DIASTOLIC BLOOD PRESSURE: 75 MMHG | RESPIRATION RATE: 24 BRPM | HEIGHT: 72 IN | OXYGEN SATURATION: 95 % | WEIGHT: 315 LBS | SYSTOLIC BLOOD PRESSURE: 129 MMHG | BODY MASS INDEX: 42.66 KG/M2

## 2022-11-28 DIAGNOSIS — C67.2 MALIGNANT NEOPLASM OF LATERAL WALL OF URINARY BLADDER (HCC): ICD-10-CM

## 2022-11-28 DIAGNOSIS — C67.2 MALIGNANT NEOPLASM OF LATERAL WALL OF URINARY BLADDER (HCC): Primary | ICD-10-CM

## 2022-11-28 LAB
BILIRUBIN, URINE, POC: NEGATIVE
BLOOD URINE, POC: NORMAL
GLUCOSE URINE, POC: NEGATIVE
KETONES, URINE, POC: NEGATIVE
LEUKOCYTE ESTERASE, URINE, POC: NEGATIVE
NITRITE, URINE, POC: NEGATIVE
PH, URINE, POC: 5.5 (ref 4.6–8)
PROTEIN,URINE, POC: NEGATIVE
SPECIFIC GRAVITY, URINE, POC: 1.01 (ref 1–1.03)
URINALYSIS CLARITY, POC: CLEAR
URINALYSIS COLOR, POC: YELLOW
UROBILINOGEN, POC: 0.2

## 2022-11-28 PROCEDURE — 81003 URINALYSIS AUTO W/O SCOPE: CPT | Performed by: UROLOGY

## 2022-11-28 PROCEDURE — 52000 CYSTOURETHROSCOPY: CPT | Performed by: UROLOGY

## 2022-11-28 PROCEDURE — 88112 CYTOPATH CELL ENHANCE TECH: CPT

## 2022-11-28 RX ORDER — MELOXICAM 15 MG/1
TABLET ORAL
COMMUNITY
Start: 2022-11-10

## 2022-11-28 ASSESSMENT — PATIENT HEALTH QUESTIONNAIRE - PHQ9
2. FEELING DOWN, DEPRESSED OR HOPELESS: 0
SUM OF ALL RESPONSES TO PHQ QUESTIONS 1-9: 0
SUM OF ALL RESPONSES TO PHQ9 QUESTIONS 1 & 2: 0
SUM OF ALL RESPONSES TO PHQ QUESTIONS 1-9: 0
1. LITTLE INTEREST OR PLEASURE IN DOING THINGS: 0
SUM OF ALL RESPONSES TO PHQ QUESTIONS 1-9: 0
SUM OF ALL RESPONSES TO PHQ QUESTIONS 1-9: 0

## 2022-11-28 NOTE — PROGRESS NOTES
Pre Cystoscopy   At risk factors reviewed:   Autoimmune diseases: no  Artificial Joints: no  Mechanical heart valve/pacemaker: no  Indwelling ureteral stent: no  Indwelling catheter: no  Elderly >80: no  Smoker: no  Oral steroid/prednisone use: no  Low weight: no  Hx of frequent UTI's: no  Prolonged hospital stay in the past 6mths (>10days): no  Diabetes: no    Provider informed of at risk factors: yes    ABX given: no

## 2022-11-28 NOTE — PATIENT INSTRUCTIONS
Patient Instructions from Today's Visit    Reason for Visit:  Cystoscopy     Diagnosis Information:  [email or web address]    Plan:  - your bladder continues to look good - we will do another cytology today. - you will be due for your scan next time you come back. Follow Up:  - 3 month cysto, scan prior    Recent Lab Results:  No visits with results within 3 Day(s) from this visit. Latest known visit with results is:   Hospital Outpatient Visit on 08/31/2022   Component Date Value Ref Range Status    Color, UA 08/31/2022 YELLOW    Final    Appearance 08/31/2022 CLEAR    Final    Specific Gravity, UA 08/31/2022 >=1.030  1.001 - 1.023 Final    pH, Urine 08/31/2022 5.5  5.0 - 9.0   Final    Protein, UA 08/31/2022 Negative  NEG mg/dL Final    Glucose, UA 08/31/2022 Negative  NEG mg/dL Final    Ketones, Urine 08/31/2022 Negative  NEG mg/dL Final    Bilirubin Urine 08/31/2022 Negative  NEG   Final    Blood, Urine 08/31/2022 Negative  NEG   Final    Urobilinogen, Urine 08/31/2022 0.2  0.2 - 1.0 EU/dL Final    Nitrite, Urine 08/31/2022 Negative  NEG   Final    Leukocyte Esterase, Urine 08/31/2022 Negative  NEG   Final         Treatment Summary has been discussed and given to patient: n/a        -------------------------------------------------------------------------------------------------------------------  Please call our office at [phone] if you have any  of the following symptoms:   Fever of 100.5 or greater  Chills  Shortness of breath  Swelling or pain in one leg    After office hours an answering service is available and will contact a provider for emergencies or if you are experiencing any of the above symptoms. Patient did express an interest in My Chart. My Chart log in information explained on the after visit summary printout at the [facility] desk.     Diana Jolly MA

## 2022-11-29 LAB
CYTOLOGY-NON GYN: NORMAL
SPECIMEN SOURCE: NORMAL

## 2022-12-02 ENCOUNTER — TELEPHONE (OUTPATIENT)
Dept: ONCOLOGY | Age: 77
End: 2022-12-02

## 2022-12-06 DIAGNOSIS — C67.2 MALIGNANT NEOPLASM OF LATERAL WALL OF URINARY BLADDER (HCC): Primary | ICD-10-CM

## 2022-12-08 ENCOUNTER — HOSPITAL ENCOUNTER (OUTPATIENT)
Dept: CT IMAGING | Age: 77
Discharge: HOME | End: 2022-12-11

## 2022-12-08 DIAGNOSIS — C67.2 MALIGNANT NEOPLASM OF LATERAL WALL OF URINARY BLADDER (HCC): ICD-10-CM

## 2022-12-15 NOTE — PROGRESS NOTES
[address] [facility]  [address] [facility]  [place], [address] [place] [address]  [phone]        Mr. Tayo Jack is a 78 y.o. 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 INTERVAL HISTORY: Patient is here today with his wife for follow-up. He has had multiple suspicious cytologies. More recently we ordered a FISH which came back as positive. 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 He also has a remote history of prostate cancer and was treated with external beam radiation therapy back in 2004. His last surveillance cystoscopy was unremarkable. His last bladder biopsy on 9/17/2021 was benign. He has not had any hematuria or significant dysuria. He underwent a CT of the abdomen pelvis on 12/8/2022. There was no hydronephrosis or upper tract abnormalities. There was slight, stable, irregularity along the left posterior lateral bladder wall. There is no pelvic lymphadenopathy. From previous note:     Patient is here today for follow-up. 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 He is status post an induction course of BCG and now for courses of maintenance BCG. He finished his fourth maintenance course on 8/31/2022. His last cytology from 8/5/2022 was suspicious. He has no complaints today. He denies any hematuria or dysuria. He did experience some worsening urgency during his latest BCG treatments, but that has since resolved. Past medical, family and social histories, as well as medications and allergies, were reviewed and updated in the medical record as appropriate.     PMH:     Past Medical History:   Diagnosis Date    Cancer ([facility] [address]     prostate       MEDs:     albuterol sulfate HFA  amLODIPine-olmesartan  dilTIAZem  gabapentin  Lumigan Soln  melatonin Tabs  meloxicam  omeprazole  rosuvastatin  triamterene-hydroCHLOROthiazide     ALLERGIES:    No Known Allergies    ROS:     Review of Systems   Constitutional: Negative. Negative for chills, fatigue and fever. Respiratory: Negative. Cardiovascular: Negative. Gastrointestinal: Negative. Genitourinary: Negative. Negative for difficulty urinating, dysuria, flank pain, frequency, hematuria and urgency. Musculoskeletal: Negative. All other systems reviewed and are negative. PHYSICAL EXAMINATION    BP 93/77 (Site: Left Upper Arm, Position: Sitting, Cuff Size: Large Adult)   Pulse 67   Temp 97.7 °F (36.5 °C) (Oral)   Resp 16   Ht 6' (1.829 m)   Wt (!) 324 lb 14.4 oz (147.4 kg)   SpO2 96%   BMI 44.06 kg/m²   General: well dressed, well nourished, no acute distress  Skin: no rashes  HEENT: Sclera are clear,normocephalic, atraumatic. no external lesions   Cardiovascular: Reg. Normal perfusion  Respiratory: normal respiratory effort, no JVD, no audible wheezing. Musculoskeletal: unremarkable with normal function. No embolic signs or cyanosis. Neurologic exam: intact, no focal deficits, moves all 4 extremities  Psych: normal mood and affect, alert, oriented x 3  LE:  no edema  : DEFERRED     IMAGING:    CT Results:    === 12/08/22 ===    CT ABDOMEN PELVIS W IV CONTRAST    - Narrative -  CT OF THE ABDOMEN AND PELVIS WITH CONTRAST. CLINICAL INDICATION: Malignant neoplasm of the bladder    PROCEDURE: Serial thin section axial images obtained from the lung bases through  the proximal femurs following the administration of 100 cc of Isovue 370  intravenous contrast. Delayed imaging was also performed. Radiation dose  reduction techniques were used for this study.  Our CT scanners use one or all of  the following: Automated exposure control, adjusted of the mA and/or kV  according to patient size, iterative reconstruction    COMPARISON: CT abdomen and pelvis dated 9/29/2021    FINDINGS: The tiny pulmonary nodule at the right lung base remains stable. No  new pulmonary nodules noted. CT ABDOMEN: The liver and spleen are normal except for calcified granulomas. No  hepatic mass evident. The gallbladder is normal. The adrenal glands are normal.  The stomach is decompressed. The kidneys are symmetric in size and contrast  enhancement. There is a simple 2.6 cm right renal cyst. No solid renal lesion  evident. There is no hydronephrosis. No upper abdominal or retroperitoneal  lymphadenopathy. There is no ascites. The bowel is normal.    CT PELVIS: The rectum is normal. No inguinal hernia or adenopathy. Delayed  imaging through the bladder show the bladder to be distended with contrast  without intraluminal filling defect. There is slight irregularity along the left  posterior lateral bladder wall with small diverticulum as noted on the prior  exam and not significant changed. The UVJs are normal. No new bladder  irregularity appreciated. The ureters are symmetric in size and contrast  enhancement. No filling defects noted in the renal pelvises. Radiation seeds  noted in the prostate. No aggressive bone lesions identified. - Impression -  1. No acute abdominal or pelvic abnormality. 2. No mass clearly appreciated in the bilateral upper collecting systems,  ureters, or bladder. There is stable slight irregularity along the left  posterior lateral bladder wall. 3. No lymphadenopathy. ASSESSMENT:    Mr. Jasiel Dunham is a 78 y.o. 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 We discussed his positive FISH test in detail today. I explained that my preference would be to perform a bluelight cystoscopy with bladder biopsy, prostatic urethral biopsy, bilateral diagnostic ureteroscopy with possible ureteral washings or biopsy. We also discussed the potential for continued observation. He like to discuss this more with his wife and call and let us know.   For now he is scheduled for repeat office cystoscopy in 3 months but we will cancel that if he elects to move forward with biopsy and ureteroscopy in the OR. We discussed the risk of that procedure in detail as he is well-informed. He has been through similar procedures multiple times. PLAN:   -review FISH results   -review CT a/p w cont   -discussed Bluelight cystoscopy and need for referral if he opts for that route  Rtc as scheduled for cystoscopy   -Bilat ureteroscopy, cystoscopy, bladder biopsy, prostatic urethra biopsy to be scheduled if pt decides to proceed. ________________________________________      I have seen and examined this patient. I have reviewed and edited the note started by the MA and agree with the outlined plan. Part of this note was written by using a voice dictation software. The note has been proof read but may still contain some grammatical/other typographical errors.       Tisha Chavarria  Urology

## 2022-12-16 ENCOUNTER — OFFICE VISIT (OUTPATIENT)
Dept: ONCOLOGY | Age: 77
End: 2022-12-16
Payer: MEDICARE

## 2022-12-16 VITALS
HEIGHT: 72 IN | BODY MASS INDEX: 42.66 KG/M2 | OXYGEN SATURATION: 96 % | TEMPERATURE: 97.7 F | SYSTOLIC BLOOD PRESSURE: 93 MMHG | HEART RATE: 67 BPM | RESPIRATION RATE: 16 BRPM | WEIGHT: 315 LBS | DIASTOLIC BLOOD PRESSURE: 77 MMHG

## 2022-12-16 DIAGNOSIS — C67.8 MALIGNANT NEOPLASM OF OVERLAPPING SITES OF BLADDER (HCC): Primary | ICD-10-CM

## 2022-12-16 DIAGNOSIS — E66.01 OBESITY, CLASS III, BMI 40-49.9 (MORBID OBESITY) (HCC): ICD-10-CM

## 2022-12-16 PROCEDURE — 1036F TOBACCO NON-USER: CPT | Performed by: UROLOGY

## 2022-12-16 PROCEDURE — 3017F COLORECTAL CA SCREEN DOC REV: CPT | Performed by: UROLOGY

## 2022-12-16 PROCEDURE — G8484 FLU IMMUNIZE NO ADMIN: HCPCS | Performed by: UROLOGY

## 2022-12-16 PROCEDURE — 1123F ACP DISCUSS/DSCN MKR DOCD: CPT | Performed by: UROLOGY

## 2022-12-16 PROCEDURE — 99214 OFFICE O/P EST MOD 30 MIN: CPT | Performed by: UROLOGY

## 2022-12-16 PROCEDURE — G8417 CALC BMI ABV UP PARAM F/U: HCPCS | Performed by: UROLOGY

## 2022-12-16 PROCEDURE — G8427 DOCREV CUR MEDS BY ELIG CLIN: HCPCS | Performed by: UROLOGY

## 2022-12-16 ASSESSMENT — PATIENT HEALTH QUESTIONNAIRE - PHQ9
SUM OF ALL RESPONSES TO PHQ9 QUESTIONS 1 & 2: 0
SUM OF ALL RESPONSES TO PHQ QUESTIONS 1-9: 0
SUM OF ALL RESPONSES TO PHQ QUESTIONS 1-9: 0
1. LITTLE INTEREST OR PLEASURE IN DOING THINGS: 0
2. FEELING DOWN, DEPRESSED OR HOPELESS: 0
SUM OF ALL RESPONSES TO PHQ QUESTIONS 1-9: 0
SUM OF ALL RESPONSES TO PHQ QUESTIONS 1-9: 0

## 2022-12-16 ASSESSMENT — ENCOUNTER SYMPTOMS
RESPIRATORY NEGATIVE: 1
GASTROINTESTINAL NEGATIVE: 1

## 2022-12-16 NOTE — PATIENT INSTRUCTIONS
Patient Instructions from Today's Visit    Reason for Visit:  Follow up     Diagnosis Information:  [email or web address]-us/[email or web address]-answers-[email or web address]-[email or web address]-[email or web address]-sheets      Plan: Your FISH testing is positive. This is indicative that cancer is present somewhere. There is a blue light cystoscopy that can potentially make the cancer visible, unfortunately we do not offer this procedure at this time. Your CT scan looks good. Follow Up: We will plan to see you back as scheduled   If you change your mind please let us know and we can schedule the procedure     Recent Lab Results:  N/A    Treatment Summary has been discussed and given to patient:   N/A      -------------------------------------------------------------------------------------------------------------------    Patient does express an interest in My Chart. My Chart log in information explained on the after visit summary printout at the [facility] desk.     Glenys Barthel, NCMA

## 2022-12-22 DIAGNOSIS — C67.8 MALIGNANT NEOPLASM OF OVERLAPPING SITES OF BLADDER (HCC): Primary | ICD-10-CM

## 2023-03-14 ENCOUNTER — TELEPHONE (OUTPATIENT)
Dept: ONCOLOGY | Age: 78
End: 2023-03-14